# Patient Record
Sex: MALE | Race: WHITE | Employment: OTHER | URBAN - METROPOLITAN AREA
[De-identification: names, ages, dates, MRNs, and addresses within clinical notes are randomized per-mention and may not be internally consistent; named-entity substitution may affect disease eponyms.]

---

## 2024-06-26 ENCOUNTER — OFFICE VISIT (OUTPATIENT)
Dept: INTERNAL MEDICINE CLINIC | Facility: CLINIC | Age: 49
End: 2024-06-26
Payer: COMMERCIAL

## 2024-06-26 ENCOUNTER — TELEPHONE (OUTPATIENT)
Age: 49
End: 2024-06-26

## 2024-06-26 VITALS
DIASTOLIC BLOOD PRESSURE: 84 MMHG | WEIGHT: 315 LBS | BODY MASS INDEX: 50.62 KG/M2 | HEIGHT: 66 IN | TEMPERATURE: 98.1 F | HEART RATE: 71 BPM | OXYGEN SATURATION: 96 % | SYSTOLIC BLOOD PRESSURE: 130 MMHG

## 2024-06-26 DIAGNOSIS — E66.01 MORBID OBESITY (HCC): Primary | ICD-10-CM

## 2024-06-26 PROCEDURE — 99213 OFFICE O/P EST LOW 20 MIN: CPT | Performed by: INTERNAL MEDICINE

## 2024-06-26 NOTE — TELEPHONE ENCOUNTER
Left message on pt voicemail to call back to reschedule his procedure. Pt procedure was cancel due to insurance not in network.

## 2024-06-26 NOTE — PROGRESS NOTES
"Assessment/Plan:    Morbid  Obesity, Hyperlipidemia     Diagnoses and all orders for this visit:    Morbid obesity (HCC)  -     Ambulatory Referral to Weight Management; Future          Subjective: No  complaints     Patient ID: Nasir Puente is a 49 y.o. male.    HPI    The following portions of the patient's history were reviewed and updated as appropriate: allergies, current medications, past family history, past medical history, past social history, past surgical history, and problem list.    Review of Systems   Constitutional: Negative.    HENT:  Negative for dental problem, drooling, ear discharge and ear pain.    Eyes:  Negative for discharge, redness and itching.   Respiratory:  Negative for apnea, cough and wheezing.    Cardiovascular:  Negative for chest pain and palpitations.   Gastrointestinal:  Negative for abdominal pain, blood in stool, diarrhea and vomiting.   Endocrine: Negative for polydipsia, polyphagia and polyuria.   Genitourinary:  Negative for decreased urine volume, dysuria and frequency.   Musculoskeletal:  Negative for arthralgias, myalgias and neck stiffness.   Skin:  Negative for pallor and wound.   Allergic/Immunologic: Negative for environmental allergies and food allergies.   Neurological:  Negative for facial asymmetry, light-headedness, numbness and headaches.   Hematological:  Negative for adenopathy. Does not bruise/bleed easily.   Psychiatric/Behavioral:  Negative for agitation, behavioral problems and confusion.          Objective:      /84 (BP Location: Left arm, Patient Position: Sitting, Cuff Size: Standard)   Pulse 71   Temp 98.1 °F (36.7 °C) (Temporal)   Ht 5' 6\" (1.676 m)   Wt (!) 155 kg (342 lb)   SpO2 96%   BMI 55.20 kg/m²          Physical Exam  Constitutional:       Appearance: Normal appearance. He is obese.   HENT:      Head: Normocephalic.      Nose: Nose normal.      Mouth/Throat:      Mouth: Mucous membranes are moist.   Eyes:      Pupils: Pupils are " equal, round, and reactive to light.   Cardiovascular:      Rate and Rhythm: Regular rhythm.      Heart sounds: Normal heart sounds.   Pulmonary:      Breath sounds: Normal breath sounds.   Abdominal:      Palpations: Abdomen is soft.   Musculoskeletal:         General: No swelling.      Cervical back: Neck supple.   Skin:     General: Skin is warm.   Neurological:      General: No focal deficit present.      Mental Status: He is alert and oriented to person, place, and time.   Psychiatric:         Mood and Affect: Mood normal.       Severe  Obesity  Referred  to  Weight loss  Clinic  to  loose  weight    Hyperlipidemia     Diet    Repeat  lipid  panel  in  6 months.      Mateus Thompson MD.FACP

## 2024-10-28 ENCOUNTER — TELEPHONE (OUTPATIENT)
Age: 49
End: 2024-10-28

## 2024-10-28 NOTE — TELEPHONE ENCOUNTER
Called patient to ask if he has new insurance now to be able to get colonoscopy done. Previous scheduled with gastro in July but not done due to insurance.

## 2024-11-20 NOTE — PROGRESS NOTES
Assessment/Plan:  Nasir was seen today for consult.    Diagnoses and all orders for this visit:    Obesity, Class III, BMI 40-49.9 (morbid obesity) (HCC)  -     tirzepatide (Zepbound) 2.5 mg/0.5 mL auto-injector; Inject 0.5 mL (2.5 mg total) under the skin once a week for 28 days    Hyperlipidemia  -     tirzepatide (Zepbound) 2.5 mg/0.5 mL auto-injector; Inject 0.5 mL (2.5 mg total) under the skin once a week for 28 days    Morbid obesity (HCC)  -     Ambulatory Referral to Weight Management    Prediabetes  -     tirzepatide (Zepbound) 2.5 mg/0.5 mL auto-injector; Inject 0.5 mL (2.5 mg total) under the skin once a week for 28 days    Essential hypertension  -     tirzepatide (Zepbound) 2.5 mg/0.5 mL auto-injector; Inject 0.5 mL (2.5 mg total) under the skin once a week for 28 days  -     valsartan (DIOVAN) 40 mg tablet; Take 1 tablet (40 mg total) by mouth daily       No problem-specific Assessment & Plan notes found for this encounter.       - Discussed options of HealthyCORE-Intensive Lifestyle Intervention Program, Very Low Calorie Diet-VLCD, Conservative Program, and Vertical Sleeve Gastrectomy and the role of weight loss medications.  - Explained the importance of making lifestyle changes first before starting anti-obesity medications.  - Patient should demonstrate lifestyle changes first before anti-obesity medication initiated.   - Patient is interested in pursuing Conservative Program  - Initial weight loss goal of 5-10% weight loss for improved health as studies have shown this is where we see the greatest impact on improving health and decreasing risk of obesity related conditions.  - Weight loss can improve patient's co-morbid conditions and/or prevent weight-related complications.  - Stop Bang 7/8 -- referred to sleep medicine   - Labs reviewed: As below.      General Recommendations:  Nutrition:  Eat breakfast daily.  Do not skip meals.     Food log (ie.) www.myfitnesspal.com, sparkpeople.com,  loseit.com, calorieking.com, etc.    Practice mindful eating.  Be sure to set aside time to eat, eat slowly, and savor your food.    Hydration:    At least 64oz of water daily.  No sugar sweetened beverages.  No juice (eat the fruit instead).    Exercise:  Studies have shown that the ideal exercise goal is somewhere between 150 to 300 minutes of moderate intensity exercise a week.  Start with exercising 10 minutes every other day and gradually increase physical activity with a goal of at least 150 minutes of moderate intensity exercise a week, divided over at least 3 days a week.  An example of this would be exercising 30 minutes a day, 5 days a week.  Resistance training can increase muscle mass and increase our resting metabolic rate.   FULL BODY resistance training is recommended 2-3 times a week.  Do not do this on consecutive days to allow for muscle recovery.    Aim for a bare minimum 5000 steps, even on days you do not exercise.    Monitoring:   Weigh yourself daily.  If this causes undue stress, then just weigh yourself once a week.  Weigh yourself the same time of the day with the same amount of clothing on.  Preferably this should be done after waking up, before you eat, and with no clothing or minimal clothing on.    Specific Goals:  5-10 servings of fruits and vegetables per day  Food log (ie.) www.hiyalife.com,sparkpeople.com,loseit.com,calorieking.com,etc.   Gradually increase physical activity to a goal of 5 days per week for 30 minutes of MODERATE intensity PLUS 2 days per week of FULL BODY resistance training    Calorie goal:  [id number](Provided with meal plan to follow).    Return visit:  2 months    1)Start on BP medication; patient will follow up with his PCP on 12/5. Will call PCP as well.   2) referral for dietitian meal planning 60 minute visit  3) RX sent in for Zepbound. If this is not covered, we will try to start with Contrave after blood pressure is controlled.      I have sent  Zebound to your pharmacy. The prior authorization process will been done through our prior authorization team and can take up to 3-4 weeks to process through the insurance.     - Start Zepbound 2.5 mg subcutaneously weekly. After you have taken the second pen, please give me an update, as we will likely increase the dose the next month if you are tolerating it well.    - Side effects of Zepbound include nausea, vomiting, diarrhea, or constipation. Keep an eye on your heart rate while on Zepbound. If you resting heart rate is greater than 100 beats per minutes, please notify me. If you develop severe abdominal pain, stop Zepbound and go to the emergency room, as that could be a sign of pancreatitis.     - Please notify me if you have surgery, upper endoscopy, or colonoscopy scheduled, as we typically hold Zepbound for one week prior to the procedure.     4) Sleep medicine referral-- encouraged patient to go as he already has a referral  5) Physical Activity RX:  Recommend patient wears a watch to track his step count; recommend minimum of 5k per day and goal of 8-10k per day.   6) Nutrition RX: given handouts today for protein snacks/shakes, 2000 calorie meal plan and food journal to track         Total time spent reviewing chart, interviewing patient, examining patient, discussing plan, answering all questions, and documentin min.       ______________________________________________________________________        Subjective:   Chief Complaint   Patient presents with    Consult     MWM- Consult; [id number]; Waist-57.5in; Stop Bang-       HPI: Nasir Puente  is a 49 y.o. male with history of  impaired fasting blood sugar, hyperlipidemia and excess weight, here to pursue weight loss management.  Previous notes and records have been reviewed. Referred by PCP.     Blood pressure high in office, not on medication, he also has been having headaches.   He started to gain weight after marriage.   Patient has worked  with nutritionist in the past and has lost weight with calorie deficit and portion sizes.   Brazilian- enjoy meat, rice, beans  Exercise- enjoys dancing, can walk in his neighborhood and around park/town.       Blood pressure is elevated today;   Sleep BANG 7/8-- > needs to go to sleep medicine   Prefers medication         HPI  Wt Readings from Last 20 Encounters:   11/21/24 (!) 158 kg (349 lb 6.4 oz)   06/26/24 (!) 155 kg (342 lb)   05/08/24 (!) 156 kg (343 lb)   02/09/23 (!) 158 kg (348 lb)   09/27/22 (!) 152 kg (336 lb)   07/06/22 (!) 151 kg (332 lb)   06/01/22 (!) 148 kg (326 lb)   08/31/21 (!) 147 kg (324 lb)   03/29/21 (!) 147 kg (323 lb)   10/29/17 136 kg (300 lb)   07/26/17 134 kg (296 lb)     Excess Weight:  Severity: severe  Onset:  30's   Highest weight: 349 lbs  Lowest Weight:   Current weight: 349 lbs  What has been tried: Diet and Exercise, Over the Counter Weight Loss Supplements, and nutritionist   Contributing factors: Poor Food Choices, Insufficient Physical Activity, and Stress/Emotional Eating  Associated symptoms and effects: comorbid conditions, fatigue, increased joint pain, decreased exercise capacity, and increased shortness of breath    Food logging: (did not have time to complete today). Wife cooks dinner at home; portion size is patients issue per wife  B:   S:  L:  S:  D:  S: frequent snacking at night as he is unable to sleep     Hunger/Cravings: hungry all the time; trouble with sleeping at night and increased snacking at night   Dining out: 1x per week  Hydration: monster energy 1 drink, 1 coke every once in a while. Drinks a lot of water   Alcohol: social 1x per month   Smoking: smoked 1 day and stopping again  Exercise:  Weight Monitoring:  Sleep: only sleeps for about 5 hrs   STOP-BANG Score:  Occupation: construction, carpentry. 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  Denies any hx of glaucoma, seizures, kidney stones, gallstones.  Denies Hx of CAD, PAD, palpitations, arrhythmia.   Denies uncontrolled anxiety or depression, suicidal behavior or thinking , insomnia or sleep disturbance.   Past Surgical History:   Procedure Laterality Date    FACIAL/NECK BIOPSY Left 7/26/2017    Procedure: SHOULDER,NECK & EPIGASTRIC AREA MASS EXCISIONS abdominal lesions x2;  Surgeon: Randy Mcmahon MD;  Location: [facility];  Service: General    GA RPR UMBILICAL HRNA 5 YRS/> REDUCIBLE N/A 7/26/2017    Procedure: REPAIR HERNIA UMBILICAL;  Surgeon: Randy Mcmahon MD;  Location: [facility];  Service: General     The following portions of the patient's history were reviewed and updated as appropriate: allergies, current medications, past family history, past medical history, past social history, past surgical history, and problem list.    Review Of Systems:  Review of Systems   Constitutional:  Positive for fatigue.   HENT: Negative.     Eyes: Negative.    Respiratory: Negative.     Gastrointestinal:  Negative for constipation, diarrhea and nausea.   Genitourinary: Negative.    Musculoskeletal: Negative.    Skin: Negative.    Allergic/Immunologic: Negative.    Neurological:  Positive for headaches.   Hematological: Negative.    Psychiatric/Behavioral: Negative.         Objective:  /100 (BP Location: Right arm, Patient Position: Sitting)   Pulse 61   Temp 97.9 °F (36.6 °C) (Tympanic)   Resp 16   Ht 5' 9\" (1.753 m)   Wt (!) 158 kg (349 lb 6.4 oz)   BMI 51.60 kg/m²   Physical Exam  Vitals reviewed.   Constitutional:       Appearance: He is obese.   HENT:      Head: Normocephalic.      Mouth/Throat:      Mouth: Mucous membranes are moist.   Eyes:      Pupils: Pupils are equal, round, and reactive to light.   Cardiovascular:      Rate and Rhythm: Normal rate and regular rhythm.   Pulmonary:      Effort: Pulmonary effort is normal.      Breath sounds: " Normal breath sounds.   Abdominal:      General: Bowel sounds are normal.      Palpations: Abdomen is soft.   Musculoskeletal:         General: Normal range of motion.      Cervical back: Normal range of motion.   Skin:     General: Skin is warm and dry.   Neurological:      General: No focal deficit present.      Mental Status: He is alert.   Psychiatric:         Mood and Affect: Mood normal.         Thought Content: Thought content normal.         Judgment: Judgment normal.         Labs and Imaging  Recent labs and imaging have been personally reviewed.  Lab Results   Component Value Date    WBC 7.27 05/09/2024    HGB 14.7 05/09/2024    HCT 45.5 05/09/2024    MCV 87 05/09/2024     05/09/2024     Lab Results   Component Value Date    SODIUM 141 05/09/2024    K 4.0 05/09/2024     05/09/2024    CO2 34 (H) 05/09/2024    AGAP 5 05/09/2024    BUN 11 05/09/2024    CREATININE 0.78 05/09/2024    GLUF 92 05/09/2024    CALCIUM 9.3 05/09/2024    AST 17 05/09/2024    ALT 22 05/09/2024    ALKPHOS 68 05/09/2024    TP 7.3 05/09/2024    TBILI 0.54 05/09/2024    EGFR 105 05/09/2024     Lab Results   Component Value Date    HGBA1C 5.7 (H) 09/01/2021     Lab Results   Component Value Date    CVY5UDVJWLST 3.403 05/09/2024     Lab Results   Component Value Date    CHOLESTEROL 219 (H) 05/09/2024     Lab Results   Component Value Date    HDL 44 05/09/2024     Lab Results   Component Value Date    TRIG 136 05/09/2024     Lab Results   Component Value Date    LDLCALC 148 (H) 05/09/2024

## 2024-11-21 ENCOUNTER — TELEPHONE (OUTPATIENT)
Age: 49
End: 2024-11-21

## 2024-11-21 ENCOUNTER — OFFICE VISIT (OUTPATIENT)
Age: 49
End: 2024-11-21
Payer: COMMERCIAL

## 2024-11-21 VITALS
RESPIRATION RATE: 16 BRPM | DIASTOLIC BLOOD PRESSURE: 100 MMHG | WEIGHT: 315 LBS | SYSTOLIC BLOOD PRESSURE: 160 MMHG | HEIGHT: 69 IN | TEMPERATURE: 97.9 F | BODY MASS INDEX: 46.65 KG/M2 | HEART RATE: 61 BPM

## 2024-11-21 DIAGNOSIS — E78.5 HYPERLIPIDEMIA: ICD-10-CM

## 2024-11-21 DIAGNOSIS — R73.03 PREDIABETES: ICD-10-CM

## 2024-11-21 DIAGNOSIS — I10 ESSENTIAL HYPERTENSION: ICD-10-CM

## 2024-11-21 DIAGNOSIS — E66.01 OBESITY, CLASS III, BMI 40-49.9 (MORBID OBESITY) (HCC): Primary | ICD-10-CM

## 2024-11-21 DIAGNOSIS — E66.01 MORBID OBESITY (HCC): ICD-10-CM

## 2024-11-21 PROCEDURE — 99204 OFFICE O/P NEW MOD 45 MIN: CPT | Performed by: PHYSICIAN ASSISTANT

## 2024-11-21 RX ORDER — TIRZEPATIDE 2.5 MG/.5ML
2.5 INJECTION, SOLUTION SUBCUTANEOUS WEEKLY
Qty: 2 ML | Refills: 0 | Status: SHIPPED | OUTPATIENT
Start: 2024-11-21 | End: 2024-12-19

## 2024-11-21 RX ORDER — VALSARTAN 40 MG/1
40 TABLET ORAL DAILY
Qty: 30 TABLET | Refills: 1 | Status: SHIPPED | OUTPATIENT
Start: 2024-11-21

## 2024-11-21 NOTE — TELEPHONE ENCOUNTER
Naomi Grayson PA-C with [facility] called to inform PCP. Pt seen in office today. Noted elevated BP, 160/100. Wife reports Pt has been having headaches recently. Naomi Grayson PA-C  started Pt on valsartan (DIOVAN) 40 mg tablet for the same with instructions to f/u with PCP as soon as possible.     No appointments seen with PCP for the next two weeks. Please inform PCP and further advise/follow up with Pt. Thank you

## 2024-11-22 ENCOUNTER — OFFICE VISIT (OUTPATIENT)
Dept: INTERNAL MEDICINE CLINIC | Facility: CLINIC | Age: 49
End: 2024-11-22
Payer: COMMERCIAL

## 2024-11-22 VITALS
BODY MASS INDEX: 46.65 KG/M2 | SYSTOLIC BLOOD PRESSURE: 138 MMHG | HEART RATE: 63 BPM | WEIGHT: 315 LBS | OXYGEN SATURATION: 99 % | HEIGHT: 69 IN | DIASTOLIC BLOOD PRESSURE: 86 MMHG | TEMPERATURE: 97.9 F

## 2024-11-22 DIAGNOSIS — R53.83 OTHER FATIGUE: ICD-10-CM

## 2024-11-22 DIAGNOSIS — Z12.11 SCREENING FOR COLON CANCER: ICD-10-CM

## 2024-11-22 DIAGNOSIS — R03.0 PREHYPERTENSION: Primary | ICD-10-CM

## 2024-11-22 DIAGNOSIS — R35.1 BPH ASSOCIATED WITH NOCTURIA: ICD-10-CM

## 2024-11-22 DIAGNOSIS — N40.1 BPH ASSOCIATED WITH NOCTURIA: ICD-10-CM

## 2024-11-22 PROCEDURE — 99214 OFFICE O/P EST MOD 30 MIN: CPT | Performed by: INTERNAL MEDICINE

## 2024-11-22 NOTE — PROGRESS NOTES
Assessment/Plan:           1. Prehypertension  Comments:  Patient was advised to lose weight and monitor his blood pressure closely at home.  2. Screening for colon cancer  Comments:  Colonoscopy was advised.  Orders:  -     Ambulatory Referral to Gastroenterology; Future  3. BPH associated with nocturia  Comments:  PSA was ordered.  Orders:  -     PSA Total, Diagnostic; Future  4. Other fatigue  Comments:  This may be multifactorial.  Blood work ordered  Orders:  -     TSH, 3rd generation; Future  -     T4, free; Future         1. Screening for colon cancer (Primary)    - Ambulatory Referral to Gastroenterology; Future    2. BPH associated with nocturia    - PSA Total, Diagnostic; Future    3. Other fatigue    - TSH, 3rd generation; Future  - T4, free; Future       No problem-specific Assessment & Plan notes found for this encounter.           Subjective:      Patient ID: Nasir Puente is a 49 y.o. male.    HPI    The following portions of the patient's history were reviewed and updated as appropriate: He  has a past medical history of Asthma.  He   Patient Active Problem List    Diagnosis Date Noted    Impaired fasting blood sugar 07/06/2022    Vitamin B-complex deficiency 10/20/2021    Pain in both feet 08/31/2021    Morbid obesity (HCC) 08/31/2021    Other fatigue 08/31/2021    Body mass index 50.0-59.9, adult (HCC) 08/31/2021    Skin tag 08/31/2021    Urinary frequency 08/31/2021     He  has a past surgical history that includes pr rpr umbilical hrna 5 yrs/> reducible (N/A, 7/26/2017) and FACIAL/NECK BIOPSY (Left, 7/26/2017).  His family history includes Colon cancer in his father; Heart disease in his mother; No Known Problems in his family.  He  reports that he has quit smoking. He has never used smokeless tobacco. He reports current alcohol use of about 7.0 standard drinks of alcohol per week. He reports that he does not use drugs.  Current Outpatient Medications   Medication Sig Dispense Refill     acetaminophen (TYLENOL) 325 mg tablet Take 650 mg by mouth every 6 (six) hours as needed for mild pain      albuterol (ProAir HFA) 90 mcg/act inhaler Inhale 2 puffs every 6 (six) hours as needed for wheezing (Patient not taking: Reported on 11/22/2024) 8.5 g 2    cetirizine (ZyrTEC) 10 mg tablet Take 1 tablet (10 mg total) by mouth daily (Patient not taking: Reported on 7/6/2022) 30 tablet 2    gabapentin (NEURONTIN) 300 mg capsule Take 1 capsule (300 mg total) by mouth 2 (two) times a day (Patient not taking: Reported on 8/31/2021) 60 capsule 1    meloxicam (MOBIC) 15 mg tablet Take 1 tablet (15 mg total) by mouth daily (Patient not taking: Reported on 6/1/2022) 30 tablet 0    montelukast (SINGULAIR) 10 mg tablet Take 1 tablet (10 mg total) by mouth daily at bedtime (Patient not taking: Reported on 7/6/2022) 30 tablet 2    Mounjaro 5 MG/0.5ML Inject 0.5 mL (5 mg total) under the skin every 7 days (Patient not taking: Reported on 6/26/2024) 2 mL 1    omeprazole (PriLOSEC) 40 MG capsule Take 1 capsule (40 mg total) by mouth daily Half an hour prior to breakfast (Patient not taking: Reported on 11/22/2024) 30 capsule 2    polyethylene glycol (GOLYTELY) 4000 mL solution Take 4,000 mL by mouth once for 1 dose (Patient not taking: Reported on 11/22/2024) 4000 mL 0    tirzepatide (Zepbound) 2.5 mg/0.5 mL auto-injector Inject 0.5 mL (2.5 mg total) under the skin once a week for 28 days (Patient not taking: Reported on 11/22/2024) 2 mL 0    valsartan (DIOVAN) 40 mg tablet Take 1 tablet (40 mg total) by mouth daily (Patient not taking: Reported on 11/22/2024) 30 tablet 1     No current facility-administered medications for this visit.     Current Outpatient Medications on File Prior to Visit   Medication Sig    acetaminophen (TYLENOL) 325 mg tablet Take 650 mg by mouth every 6 (six) hours as needed for mild pain    albuterol (ProAir HFA) 90 mcg/act inhaler Inhale 2 puffs every 6 (six) hours as needed for wheezing (Patient  not taking: Reported on 11/22/2024)    cetirizine (ZyrTEC) 10 mg tablet Take 1 tablet (10 mg total) by mouth daily (Patient not taking: Reported on 7/6/2022)    gabapentin (NEURONTIN) 300 mg capsule Take 1 capsule (300 mg total) by mouth 2 (two) times a day (Patient not taking: Reported on 8/31/2021)    meloxicam (MOBIC) 15 mg tablet Take 1 tablet (15 mg total) by mouth daily (Patient not taking: Reported on 6/1/2022)    montelukast (SINGULAIR) 10 mg tablet Take 1 tablet (10 mg total) by mouth daily at bedtime (Patient not taking: Reported on 7/6/2022)    Mounjaro 5 MG/0.5ML Inject 0.5 mL (5 mg total) under the skin every 7 days (Patient not taking: Reported on 6/26/2024)    omeprazole (PriLOSEC) 40 MG capsule Take 1 capsule (40 mg total) by mouth daily Half an hour prior to breakfast (Patient not taking: Reported on 11/22/2024)    polyethylene glycol (GOLYTELY) 4000 mL solution Take 4,000 mL by mouth once for 1 dose (Patient not taking: Reported on 11/22/2024)    tirzepatide (Zepbound) 2.5 mg/0.5 mL auto-injector Inject 0.5 mL (2.5 mg total) under the skin once a week for 28 days (Patient not taking: Reported on 11/22/2024)    valsartan (DIOVAN) 40 mg tablet Take 1 tablet (40 mg total) by mouth daily (Patient not taking: Reported on 11/22/2024)     No current facility-administered medications on file prior to visit.     There are no discontinued medications.   He has no known allergies..    Review of Systems   Constitutional:  Negative for appetite change, chills, fatigue and fever.   HENT:  Negative for sore throat and trouble swallowing.    Eyes:  Negative for redness.   Respiratory:  Negative for shortness of breath.    Cardiovascular:  Negative for chest pain and palpitations.   Gastrointestinal:  Negative for abdominal pain, constipation and diarrhea.   Genitourinary:  Negative for dysuria and hematuria.   Musculoskeletal:  Negative for back pain and neck pain.   Skin:  Negative for rash.   Neurological:   "Negative for seizures, weakness and headaches.   Hematological:  Negative for adenopathy.   Psychiatric/Behavioral:  Negative for confusion. The patient is not nervous/anxious.          Objective:      /86 (BP Location: Left arm, Patient Position: Standing, Cuff Size: Standard)   Pulse 63   Temp 97.9 °F (36.6 °C) (Temporal)   Ht 5' 9\" (1.753 m)   Wt (!) 160 kg (352 lb)   SpO2 99%   BMI 51.98 kg/m²     Results Reviewed       None            No results found for this or any previous visit (from the past 8 weeks).     Physical Exam  Constitutional:       Appearance: Normal appearance. He is normal weight.   HENT:      Head: Normocephalic and atraumatic.      Nose: Nose normal.      Mouth/Throat:      Mouth: Mucous membranes are moist.   Eyes:      Extraocular Movements: Extraocular movements intact.      Pupils: Pupils are equal, round, and reactive to light.   Pulmonary:      Effort: Pulmonary effort is normal.   Abdominal:      Palpations: Abdomen is soft.   Musculoskeletal:         General: Normal range of motion.      Cervical back: Normal range of motion and neck supple.   Neurological:      General: No focal deficit present.      Mental Status: He is alert and oriented to person, place, and time. Mental status is at baseline.         "

## 2024-12-05 ENCOUNTER — RA CDI HCC (OUTPATIENT)
Dept: OTHER | Facility: HOSPITAL | Age: 49
End: 2024-12-05

## 2024-12-30 ENCOUNTER — TELEPHONE (OUTPATIENT)
Age: 49
End: 2024-12-30

## 2024-12-30 ENCOUNTER — CLINICAL SUPPORT (OUTPATIENT)
Age: 49
End: 2024-12-30

## 2024-12-30 VITALS — WEIGHT: 315 LBS | HEIGHT: 69 IN | BODY MASS INDEX: 46.65 KG/M2

## 2024-12-30 DIAGNOSIS — I10 ESSENTIAL HYPERTENSION: ICD-10-CM

## 2024-12-30 DIAGNOSIS — E78.5 HYPERLIPIDEMIA: ICD-10-CM

## 2024-12-30 DIAGNOSIS — R63.5 ABNORMAL WEIGHT GAIN: Primary | ICD-10-CM

## 2024-12-30 DIAGNOSIS — E66.01 OBESITY, CLASS III, BMI 40-49.9 (MORBID OBESITY) (HCC): Primary | ICD-10-CM

## 2024-12-30 DIAGNOSIS — R73.03 PREDIABETES: ICD-10-CM

## 2024-12-30 PROCEDURE — RECHECK

## 2024-12-30 PROCEDURE — WMDI60

## 2024-12-30 RX ORDER — TIRZEPATIDE 2.5 MG/.5ML
2.5 INJECTION, SOLUTION SUBCUTANEOUS WEEKLY
Qty: 2 ML | Refills: 0 | Status: SHIPPED | OUTPATIENT
Start: 2024-12-30 | End: 2025-01-07

## 2024-12-30 NOTE — TELEPHONE ENCOUNTER
PA for Zepbound SUBMITTED to [employer]     via    [x]CMM-KEY: [id number]  []Surescripts-Case ID #    []Availity-Auth ID #  NDC #    []Faxed to plan   []Other website    []Phone call Case ID #      []PA sent as URGENT    All office notes, labs and other pertaining documents and studies sent. Clinical questions answered. Awaiting determination from insurance company.     Turnaround time for your insurance to make a decision on your Prior Authorization can take 7-21 business days.

## 2024-12-30 NOTE — PROGRESS NOTES
"Weight Management Medical Nutrition Assessment  Nasir presented for a meal planning session with wife. Pt is [place] and speaks [place]ian. Wife was able to assist with translation. Today's weight is 345.6#, loss of 3.4# or 1% TBW.  Hx of impaired fasting blood sugar, hyperlipidemia , HTN. Prescribed Zepbound but waiting for coverage. Highly motivated to start the combination of medication and calorie restriction.    Per dietary recall patient consumes excess calories from  carbohydrates such as bread and excess dining out at [facility].  Wife states patient enjoys large servings of carbohydrates such as bread, rice and pasta and also frequent stops to [facility] for bread, sandwiches and coffee at least 5-6 times a week. Educated on the importance of balancing all meals and with the importance of adequate protein and fiber. Reviewed lean protein and balanced snacks. Hydration adequate.   Developed and reviewed a low calorie meal plan. Goals established. Will follow up in 4 weeks.      Goals:  1) goal is to add more vegetables   2) Add in exercise 2-3 days a week(walkling)     Patient seen by Medical Provider in past 6 months:  yes 11/21/2024  Requested to schedule appointment with Medical Provider: No      Anthropometric Measurements  Start Weight (#) & Date: 349# 11/21/2024  Current Weight (#): 345.6#  TBW % Change from start weight:1%  Ideal Body Weight (#):160# (5'9)  Goal Weight (#):\"Better health to be around family\"   Highest: 349#  Lowest: 240#    Weight Loss History  Previous weight loss attempts: Diet and Exercise, Over the Counter Weight Loss Supplements, and nutritionist     Food and Nutrition Related History  Wake up: Varies 5-8 am   Bed Time:Varies--doesn't sleep well at night    Food Recall  Breakfast:6 am: Coffee with sugar with 4 tsp, and cream a \"little bit\" unmeasured Sometimes from [employer]. Breakfast sandwich 1-2 eggs, 1 slice of cheese or [employer] short bread with butter    Snack:skip  Lunch:12-1 pm: Rice and " "beans, meat/chicken or sandwich leftovers from dinner night before or from [facility]  grilled chicken/tomato/ lettuce/ rider or sometimes burrito   Snack:coffee 16 oz sometimes with Grilled cheese from [facility]  Dinner:Homemade: Rice + beans+  meat( chicken/fish) + salad Eats late around 9 pm  Snack: n/a      Beverages: water, juice, and chocolate milk  Volume of beverage intake: 32 oz coffee, 5 16 oz bottles water a day    Weekends: Same Meals are not regualr- varied. Can skip meals breakfast and lunch  Cravings: bread and rice  Trouble area of day:Dinner    Frequency of Eating out: 5-6 days a week  Food restrictions:n/a  Cooking: Wife  Food Shopping: Wife    Physical Activity Intake  Activity:enjoys dancing, can walk in his neighborhood and around park/town. Works as a construction work.Would like to start walking 3 times a week in the community center  Frequency: n/a  Physical limitations/barriers to exercise: \"dislikes\" exercise    Estimated Needs  Energy  SECA: BMR:n/a      X 1.3 -1000 =  [place] [employer] Energy Needs: BMR : 2418   1-2# loss weekly sedentary:  9992-5614             1-2# loss weekly lightly active:4745-7292  Maintenance calories for sedentary activity level: 2902  Protein: grams      (1.2-1.5g/kg IBW)  Fluid: 84 oz     (35mL/kg IBW)    Nutrition Diagnosis  Yes;    Overweight/obesity  related to Excess energy intake as evidenced by  BMI more than normative standard for age and sex (obesity-grade III 40+)       Nutrition Intervention    Nutrition Prescription  Calories:2200 kcal   Protein:100 grams  Fluid:84 oz    Meal Plan (José Miguel/Pro/Carb)  Breakfast:450/25-30  Snack:200/5-10 grams  Lunch:550/25-35 grams  Snack:200/5-10 grams  Dinner:600 kcal/25-35 kcal  Snack:skip    Nutrition Education:    Calorie controlled menu  Lean protein food choices  Healthy snack options  Food journaling tips      Nutrition Counseling:  Strategies: meal planning, portion sizes, healthy snack choices, hydration, fiber intake, " protein intake, exercise, food journal      Monitoring and Evaluation:  Evaluation criteria:  Energy Intake  Meet protein needs  Maintain adequate hydration  Monitor weekly weight  Meal planning/preparation  Food journal   Decreased portions at mealtimes and snacks  Physical activity     Barriers to learning:language  Readiness to change: Action:  (Changing behavior)  Comprehension: good  Expected Compliance: good

## 2025-01-02 ENCOUNTER — PATIENT MESSAGE (OUTPATIENT)
Age: 50
End: 2025-01-02

## 2025-01-02 NOTE — TELEPHONE ENCOUNTER
PA for Zepbound DENIED    Reason:(Screenshot if applicable)Current benefits do not include coverage for anti obesity therapy        Message sent to office clinical pool No      Denial letter scanned into Media Yes    Appeal started No (Provider will need to decide if appeal is warranted and send clinical documentation to Prior Authorization Team for initiation.)    **Please follow up with your patient regarding denial and next steps**

## 2025-01-03 NOTE — PATIENT COMMUNICATION
Spouse called and asked if Naomi could call her to discuss the options, since Zepbound was denied. Please call Neela at [phone] to discuss.  She said her  does not speak good english.

## 2025-01-06 ENCOUNTER — TELEPHONE (OUTPATIENT)
Age: 50
End: 2025-01-06

## 2025-01-07 DIAGNOSIS — R06.83 SNORING: ICD-10-CM

## 2025-01-07 DIAGNOSIS — E66.01 OBESITY, CLASS III, BMI 40-49.9 (MORBID OBESITY) (HCC): Primary | ICD-10-CM

## 2025-01-07 DIAGNOSIS — R73.03 PREDIABETES: ICD-10-CM

## 2025-01-07 DIAGNOSIS — E78.5 HYPERLIPIDEMIA: ICD-10-CM

## 2025-01-07 DIAGNOSIS — I10 ESSENTIAL HYPERTENSION: ICD-10-CM

## 2025-01-07 RX ORDER — TIRZEPATIDE 2.5 MG/.5ML
2.5 INJECTION, SOLUTION SUBCUTANEOUS WEEKLY
Qty: 2 ML | Refills: 0 | Status: SHIPPED | OUTPATIENT
Start: 2025-01-07

## 2025-01-07 NOTE — PATIENT COMMUNICATION
Pt's spouse called and stated that she is still waiting for a call back to discuss the pt's Zepbound. Please call Neela at [phone]

## 2025-01-08 ENCOUNTER — APPOINTMENT (OUTPATIENT)
Dept: LAB | Age: 50
End: 2025-01-08
Payer: COMMERCIAL

## 2025-01-08 DIAGNOSIS — N40.1 BPH ASSOCIATED WITH NOCTURIA: ICD-10-CM

## 2025-01-08 DIAGNOSIS — R53.83 OTHER FATIGUE: ICD-10-CM

## 2025-01-08 DIAGNOSIS — R73.03 PREDIABETES: ICD-10-CM

## 2025-01-08 DIAGNOSIS — E78.5 HYPERLIPIDEMIA: ICD-10-CM

## 2025-01-08 DIAGNOSIS — E66.01 OBESITY, CLASS III, BMI 40-49.9 (MORBID OBESITY) (HCC): ICD-10-CM

## 2025-01-08 DIAGNOSIS — I10 ESSENTIAL HYPERTENSION: ICD-10-CM

## 2025-01-08 DIAGNOSIS — R35.1 BPH ASSOCIATED WITH NOCTURIA: ICD-10-CM

## 2025-01-08 LAB
ALBUMIN SERPL BCG-MCNC: 4.4 G/DL (ref 3.5–5)
ALP SERPL-CCNC: 63 U/L (ref 34–104)
ALT SERPL W P-5'-P-CCNC: 26 U/L (ref 7–52)
ANION GAP SERPL CALCULATED.3IONS-SCNC: 7 MMOL/L (ref 4–13)
AST SERPL W P-5'-P-CCNC: 18 U/L (ref 13–39)
BILIRUB SERPL-MCNC: 0.56 MG/DL (ref 0.2–1)
BUN SERPL-MCNC: 12 MG/DL (ref 5–25)
CALCIUM SERPL-MCNC: 9.2 MG/DL (ref 8.4–10.2)
CHLORIDE SERPL-SCNC: 103 MMOL/L (ref 96–108)
CHOLEST SERPL-MCNC: 212 MG/DL (ref ?–200)
CO2 SERPL-SCNC: 31 MMOL/L (ref 21–32)
CREAT SERPL-MCNC: 0.84 MG/DL (ref 0.6–1.3)
EST. AVERAGE GLUCOSE BLD GHB EST-MCNC: 131 MG/DL
GFR SERPL CREATININE-BSD FRML MDRD: 102 ML/MIN/1.73SQ M
GLUCOSE P FAST SERPL-MCNC: 83 MG/DL (ref 65–99)
HBA1C MFR BLD: 6.2 %
HDLC SERPL-MCNC: 38 MG/DL
INSULIN SERPL-ACNC: 13.32 UIU/ML (ref 1.9–23)
LDLC SERPL CALC-MCNC: 143 MG/DL (ref 0–100)
NONHDLC SERPL-MCNC: 174 MG/DL
POTASSIUM SERPL-SCNC: 4.3 MMOL/L (ref 3.5–5.3)
PROT SERPL-MCNC: 7.6 G/DL (ref 6.4–8.4)
PSA SERPL-MCNC: 0.29 NG/ML (ref 0–4)
SODIUM SERPL-SCNC: 141 MMOL/L (ref 135–147)
T4 FREE SERPL-MCNC: 0.85 NG/DL (ref 0.61–1.12)
TRIGL SERPL-MCNC: 153 MG/DL (ref ?–150)
TSH SERPL DL<=0.05 MIU/L-ACNC: 4.05 UIU/ML (ref 0.45–4.5)

## 2025-01-08 PROCEDURE — 83036 HEMOGLOBIN GLYCOSYLATED A1C: CPT

## 2025-01-08 PROCEDURE — 80053 COMPREHEN METABOLIC PANEL: CPT

## 2025-01-08 PROCEDURE — 80061 LIPID PANEL: CPT

## 2025-01-08 PROCEDURE — 36415 COLL VENOUS BLD VENIPUNCTURE: CPT

## 2025-01-08 PROCEDURE — 84439 ASSAY OF FREE THYROXINE: CPT

## 2025-01-08 PROCEDURE — 84153 ASSAY OF PSA TOTAL: CPT

## 2025-01-08 PROCEDURE — 84443 ASSAY THYROID STIM HORMONE: CPT

## 2025-01-08 PROCEDURE — 83525 ASSAY OF INSULIN: CPT

## 2025-01-09 ENCOUNTER — RESULTS FOLLOW-UP (OUTPATIENT)
Dept: INTERNAL MEDICINE CLINIC | Facility: CLINIC | Age: 50
End: 2025-01-09

## 2025-01-09 ENCOUNTER — RESULTS FOLLOW-UP (OUTPATIENT)
Age: 50
End: 2025-01-09

## 2025-01-21 ENCOUNTER — TELEPHONE (OUTPATIENT)
Age: 50
End: 2025-01-21

## 2025-01-21 ENCOUNTER — OFFICE VISIT (OUTPATIENT)
Age: 50
End: 2025-01-21

## 2025-01-21 ENCOUNTER — OFFICE VISIT (OUTPATIENT)
Dept: INTERNAL MEDICINE CLINIC | Facility: CLINIC | Age: 50
End: 2025-01-21

## 2025-01-21 VITALS
BODY MASS INDEX: 46.65 KG/M2 | SYSTOLIC BLOOD PRESSURE: 140 MMHG | HEIGHT: 69 IN | WEIGHT: 315 LBS | RESPIRATION RATE: 16 BRPM | HEART RATE: 79 BPM | DIASTOLIC BLOOD PRESSURE: 90 MMHG | TEMPERATURE: 97.6 F

## 2025-01-21 VITALS
SYSTOLIC BLOOD PRESSURE: 150 MMHG | HEART RATE: 66 BPM | HEIGHT: 69 IN | WEIGHT: 315 LBS | BODY MASS INDEX: 46.65 KG/M2 | OXYGEN SATURATION: 94 % | TEMPERATURE: 98.4 F | DIASTOLIC BLOOD PRESSURE: 96 MMHG

## 2025-01-21 DIAGNOSIS — E66.01 OBESITY, CLASS III, BMI 40-49.9 (MORBID OBESITY) (HCC): Primary | ICD-10-CM

## 2025-01-21 DIAGNOSIS — E78.5 HYPERLIPIDEMIA: ICD-10-CM

## 2025-01-21 DIAGNOSIS — E66.01 MORBID OBESITY (HCC): ICD-10-CM

## 2025-01-21 DIAGNOSIS — R73.03 PREDIABETES: ICD-10-CM

## 2025-01-21 DIAGNOSIS — M19.90 ARTHRITIS: ICD-10-CM

## 2025-01-21 DIAGNOSIS — Z12.11 SCREENING FOR COLON CANCER: ICD-10-CM

## 2025-01-21 DIAGNOSIS — I10 ESSENTIAL HYPERTENSION: Primary | ICD-10-CM

## 2025-01-21 DIAGNOSIS — I10 ESSENTIAL HYPERTENSION: ICD-10-CM

## 2025-01-21 DIAGNOSIS — R73.01 IMPAIRED FASTING BLOOD SUGAR: ICD-10-CM

## 2025-01-21 PROCEDURE — 99213 OFFICE O/P EST LOW 20 MIN: CPT | Performed by: INTERNAL MEDICINE

## 2025-01-21 PROCEDURE — 99214 OFFICE O/P EST MOD 30 MIN: CPT | Performed by: PHYSICIAN ASSISTANT

## 2025-01-21 RX ORDER — TIRZEPATIDE 2.5 MG/.5ML
2.5 INJECTION, SOLUTION SUBCUTANEOUS WEEKLY
Qty: 2 ML | Refills: 0 | Status: SHIPPED | OUTPATIENT
Start: 2025-01-21 | End: 2025-01-21

## 2025-01-21 RX ORDER — VALSARTAN 40 MG/1
40 TABLET ORAL DAILY
Qty: 30 TABLET | Refills: 1 | Status: CANCELLED | OUTPATIENT
Start: 2025-01-21

## 2025-01-21 RX ORDER — VALSARTAN 80 MG/1
80 TABLET ORAL DAILY
Qty: 90 TABLET | Refills: 1 | Status: SHIPPED | OUTPATIENT
Start: 2025-01-21

## 2025-01-21 RX ORDER — METFORMIN HYDROCHLORIDE 500 MG/1
1000 TABLET, EXTENDED RELEASE ORAL
Qty: 60 TABLET | Refills: 1 | Status: SHIPPED | OUTPATIENT
Start: 2025-01-21

## 2025-01-21 RX ORDER — TIRZEPATIDE 2.5 MG/.5ML
2.5 INJECTION, SOLUTION SUBCUTANEOUS WEEKLY
Qty: 2 ML | Refills: 0 | Status: SHIPPED | OUTPATIENT
Start: 2025-01-21 | End: 2025-02-20

## 2025-01-21 NOTE — PROGRESS NOTES
Assessment/Plan:           1. Essential hypertension  Comments:  Continue valsartan and continue to monitor at home.  Orders:  -     valsartan (DIOVAN) 80 mg tablet; Take 1 tablet (80 mg total) by mouth daily  -     Basic metabolic panel; Future; Expected date: 02/21/2025  2. Screening for colon cancer  -     Ambulatory Referral to Gastroenterology; Future  3. Impaired fasting blood sugar  4. Morbid obesity (HCC)  Comments:  Following with bariatrics and is on Mounjaro.  5. Arthritis  Comments:  Most likely degenerative joint disease and Tylenol was advised.         1. Essential hypertension (Primary)      2. Screening for colon cancer      3. Impaired fasting blood sugar      4. Morbid obesity (HCC)         No problem-specific Assessment & Plan notes found for this encounter.           Subjective:      Patient ID: Nasir Puente is a 49 y.o. male.    HPI    The following portions of the patient's history were reviewed and updated as appropriate: He  has a past medical history of Asthma.  He   Patient Active Problem List    Diagnosis Date Noted    Essential hypertension 01/21/2025    Impaired fasting blood sugar 07/06/2022    Vitamin B-complex deficiency 10/20/2021    Pain in both feet 08/31/2021    Morbid obesity (HCC) 08/31/2021    Other fatigue 08/31/2021    Skin tag 08/31/2021    Urinary frequency 08/31/2021     He  has a past surgical history that includes pr rpr umbilical hrna 5 yrs/> reducible (N/A, 7/26/2017) and FACIAL/NECK BIOPSY (Left, 7/26/2017).  His family history includes Colon cancer in his father; Heart disease in his mother; No Known Problems in his family.  He  reports that he has quit smoking. He has never used smokeless tobacco. He reports current alcohol use of about 7.0 standard drinks of alcohol per week. He reports that he does not use drugs.  Current Outpatient Medications   Medication Sig Dispense Refill    valsartan (DIOVAN) 80 mg tablet Take 1 tablet (80 mg total) by mouth daily 90 tablet  1    acetaminophen (TYLENOL) 325 mg tablet Take 650 mg by mouth every 6 (six) hours as needed for mild pain (Patient not taking: Reported on 1/21/2025)      albuterol (ProAir HFA) 90 mcg/act inhaler Inhale 2 puffs every 6 (six) hours as needed for wheezing (Patient not taking: Reported on 1/21/2025) 8.5 g 2    cetirizine (ZyrTEC) 10 mg tablet Take 1 tablet (10 mg total) by mouth daily (Patient not taking: Reported on 7/6/2022) 30 tablet 2    gabapentin (NEURONTIN) 300 mg capsule Take 1 capsule (300 mg total) by mouth 2 (two) times a day (Patient not taking: Reported on 8/31/2021) 60 capsule 1    meloxicam (MOBIC) 15 mg tablet Take 1 tablet (15 mg total) by mouth daily (Patient not taking: Reported on 6/1/2022) 30 tablet 0    metFORMIN (GLUCOPHAGE-XR) 500 mg 24 hr tablet Take 2 tablets (1,000 mg total) by mouth daily with breakfast (Patient not taking: Reported on 1/21/2025) 60 tablet 1    montelukast (SINGULAIR) 10 mg tablet Take 1 tablet (10 mg total) by mouth daily at bedtime (Patient not taking: Reported on 7/6/2022) 30 tablet 2    Mounjaro 5 MG/0.5ML Inject 0.5 mL (5 mg total) under the skin every 7 days (Patient not taking: Reported on 6/26/2024) 2 mL 1    polyethylene glycol (GOLYTELY) 4000 mL solution Take 4,000 mL by mouth once for 1 dose (Patient not taking: Reported on 6/26/2024) 4000 mL 0    Tirzepatide-Weight Management (Zepbound) 2.5 MG/0.5ML SOLN Inject 2.5 mg under the skin once a week (Patient not taking: Reported on 1/21/2025) 2 mL 0     No current facility-administered medications for this visit.     Current Outpatient Medications on File Prior to Visit   Medication Sig    [DISCONTINUED] valsartan (DIOVAN) 40 mg tablet Take 1 tablet (40 mg total) by mouth daily    acetaminophen (TYLENOL) 325 mg tablet Take 650 mg by mouth every 6 (six) hours as needed for mild pain (Patient not taking: Reported on 1/21/2025)    albuterol (ProAir HFA) 90 mcg/act inhaler Inhale 2 puffs every 6 (six) hours as needed  for wheezing (Patient not taking: Reported on 1/21/2025)    cetirizine (ZyrTEC) 10 mg tablet Take 1 tablet (10 mg total) by mouth daily (Patient not taking: Reported on 7/6/2022)    gabapentin (NEURONTIN) 300 mg capsule Take 1 capsule (300 mg total) by mouth 2 (two) times a day (Patient not taking: Reported on 8/31/2021)    meloxicam (MOBIC) 15 mg tablet Take 1 tablet (15 mg total) by mouth daily (Patient not taking: Reported on 6/1/2022)    metFORMIN (GLUCOPHAGE-XR) 500 mg 24 hr tablet Take 2 tablets (1,000 mg total) by mouth daily with breakfast (Patient not taking: Reported on 1/21/2025)    montelukast (SINGULAIR) 10 mg tablet Take 1 tablet (10 mg total) by mouth daily at bedtime (Patient not taking: Reported on 7/6/2022)    Mounjaro 5 MG/0.5ML Inject 0.5 mL (5 mg total) under the skin every 7 days (Patient not taking: Reported on 6/26/2024)    polyethylene glycol (GOLYTELY) 4000 mL solution Take 4,000 mL by mouth once for 1 dose (Patient not taking: Reported on 6/26/2024)    Tirzepatide-Weight Management (Zepbound) 2.5 MG/0.5ML SOLN Inject 2.5 mg under the skin once a week (Patient not taking: Reported on 1/21/2025)    [DISCONTINUED] omeprazole (PriLOSEC) 40 MG capsule Take 1 capsule (40 mg total) by mouth daily Half an hour prior to breakfast (Patient not taking: Reported on 11/22/2024)    [DISCONTINUED] Tirzepatide-Weight Management (Zepbound) 2.5 MG/0.5ML SOLN Inject 2.5 mg under the skin once a week (Patient not taking: Reported on 1/21/2025)    [DISCONTINUED] Tirzepatide-Weight Management (Zepbound) 2.5 MG/0.5ML SOLN Inject 2.5 mg under the skin once a week     No current facility-administered medications on file prior to visit.     Medications Discontinued During This Encounter   Medication Reason    valsartan (DIOVAN) 40 mg tablet     omeprazole (PriLOSEC) 40 MG capsule       He has no known allergies..    Review of Systems   Constitutional:  Negative for appetite change, chills, fatigue and fever.  "  HENT:  Negative for sore throat and trouble swallowing.    Eyes:  Negative for redness.   Respiratory:  Negative for shortness of breath.    Cardiovascular:  Negative for chest pain and palpitations.   Gastrointestinal:  Negative for abdominal pain, constipation and diarrhea.   Genitourinary:  Negative for dysuria and hematuria.   Musculoskeletal:  Positive for arthralgias. Negative for back pain and neck pain.   Skin:  Negative for rash.   Neurological:  Negative for seizures, weakness and headaches.   Hematological:  Negative for adenopathy.   Psychiatric/Behavioral:  Negative for confusion. The patient is not nervous/anxious.          Objective:      /96 (BP Location: Left arm, Patient Position: Sitting, Cuff Size: Standard)   Pulse 66   Temp 98.4 °F (36.9 °C) (Temporal)   Ht 5' 9\" (1.753 m)   Wt (!) 154 kg (340 lb)   SpO2 94%   BMI 50.21 kg/m²     Results Reviewed       None            Recent Results (from the past 8 weeks)   PSA Total, Diagnostic    Collection Time: 01/08/25 10:55 AM   Result Value Ref Range    PSA, Diagnostic 0.294 0.000 - 4.000 ng/mL   TSH, 3rd generation    Collection Time: 01/08/25 10:55 AM   Result Value Ref Range    TSH 3RD GENERATON 4.054 0.450 - 4.500 uIU/mL   T4, free    Collection Time: 01/08/25 10:55 AM   Result Value Ref Range    Free T4 0.85 0.61 - 1.12 ng/dL   Lipid panel    Collection Time: 01/08/25 10:55 AM   Result Value Ref Range    Cholesterol 212 (H) See Comment mg/dL    Triglycerides 153 (H) See Comment mg/dL    HDL, Direct 38 (L) >=40 mg/dL    LDL Calculated 143 (H) 0 - 100 mg/dL    Non-HDL-Chol (CHOL-HDL) 174 mg/dl   Insulin, fasting    Collection Time: 01/08/25 10:55 AM   Result Value Ref Range    Insulin, Fasting 13.32 1.90 - 23.00 uIU/mL   Hemoglobin A1C    Collection Time: 01/08/25 10:55 AM   Result Value Ref Range    Hemoglobin A1C 6.2 (H) Normal 4.0-5.6%; PreDiabetic 5.7-6.4%; Diabetic >=6.5%; Glycemic control for adults with diabetes <7.0% %    EAG " 131 mg/dl   Comprehensive metabolic panel    Collection Time: 01/08/25 10:55 AM   Result Value Ref Range    Sodium 141 135 - 147 mmol/L    Potassium 4.3 3.5 - 5.3 mmol/L    Chloride 103 96 - 108 mmol/L    CO2 31 21 - 32 mmol/L    ANION GAP 7 4 - 13 mmol/L    BUN 12 5 - 25 mg/dL    Creatinine 0.84 0.60 - 1.30 mg/dL    Glucose, Fasting 83 65 - 99 mg/dL    Calcium 9.2 8.4 - 10.2 mg/dL    AST 18 13 - 39 U/L    ALT 26 7 - 52 U/L    Alkaline Phosphatase 63 34 - 104 U/L    Total Protein 7.6 6.4 - 8.4 g/dL    Albumin 4.4 3.5 - 5.0 g/dL    Total Bilirubin 0.56 0.20 - 1.00 mg/dL    eGFR 102 ml/min/1.73sq m        Physical Exam  Constitutional:       General: He is not in acute distress.     Appearance: Normal appearance. He is obese.   HENT:      Head: Normocephalic and atraumatic.      Nose: Nose normal.      Mouth/Throat:      Mouth: Mucous membranes are moist.   Eyes:      Extraocular Movements: Extraocular movements intact.      Pupils: Pupils are equal, round, and reactive to light.   Cardiovascular:      Rate and Rhythm: Normal rate and regular rhythm.      Pulses: Normal pulses.      Heart sounds: Normal heart sounds. No murmur heard.     No friction rub.   Pulmonary:      Effort: Pulmonary effort is normal. No respiratory distress.      Breath sounds: Normal breath sounds. No wheezing.   Abdominal:      General: Abdomen is flat. Bowel sounds are normal. There is no distension.      Palpations: Abdomen is soft. There is no mass.      Tenderness: There is no abdominal tenderness. There is no guarding.   Musculoskeletal:         General: Normal range of motion.      Cervical back: Neck supple.   Skin:     General: Skin is warm.   Neurological:      General: No focal deficit present.      Mental Status: He is alert and oriented to person, place, and time. Mental status is at baseline.      Cranial Nerves: No cranial nerve deficit.   Psychiatric:         Mood and Affect: Mood normal.         Behavior: Behavior normal.

## 2025-01-21 NOTE — PATIENT INSTRUCTIONS
Metformin instructions     Take 1 tablet 500mg in AM with breakfast x 7 days and then increase to 2 tablets with breakfast.     cautions: Nausea and diarrhea are the most common side effects which may improve in 1 to 2 weeks. Hypoglycemia may occur with insufficient calorie intake, strenuous exercise, or concomitant use of hypoglycemic agents or ethanol; increased risk in elderly.   Low vitamin B12 levels may occur; monitoring recommended.  Recommended supplementation with a good-quality over-the-counter B complex vitamin.  B12 levels will be checked yearly and additional supplementation recommended if needed.    Adverse effects   Cobalamin deficiency (7% to 17.4% )  Gastrointestinal: Diarrhea (53% (immediate-release) ; 10% to 13% (extended-release) ), Flatulence , Indigestion, Malabsorption syndrome, Nausea (7% (extended-release) ), Vomiting (Up to 25.5% )    I have sent Zebound to your pharmacy. The prior authorization process will been done through our prior authorization team and can take up to 3-4 weeks to process through the insurance.     - Start Zepbound 2.5 mg subcutaneously weekly. After you have taken the second pen, please give me an update, as we will likely increase the dose the next month if you are tolerating it well.    - Side effects of Zepbound include nausea, vomiting, diarrhea, or constipation. Keep an eye on your heart rate while on Zepbound. If you resting heart rate is greater than 100 beats per minutes, please notify me. If you develop severe abdominal pain, stop Zepbound and go to the emergency room, as that could be a sign of pancreatitis.     - Please notify me if you have surgery, upper endoscopy, or colonoscopy scheduled, as we typically hold Zepbound for one week prior to the procedure.     GLP-1 Managing Side Effects Tips:  - focus on small frequent meals  - moderate sugar and fat intake  - change the injection site (abdomen --> thigh--> back of arm)  - eat protein, crackers, mints  and preethi-based drinks  - nighttime injections  - drink plenty of water  - consider fiber supplements like miralax    Tips for Nausea:  - Don't drink and eat simultaneously: separate fluids 30-60 minutes before and after meals when experiencing nausea or if you notice you become full quickly  - Choose mild smelling foods- strong smells can exacerbate nausea  - Preethi and peppermint- consider drinking preethi or peppermint tea, which can help alleviate symptoms  - Eat- don't skip meals, if you have nausea, it is understandable that you may not feel like eating. However, skipping meals is generally not recommended as this can lead to lower blood sugar and fatigue. Furthermore, it is essential to consume adequate protein during weight loss. You can opt for a protein shake, a meal replacement supplement, bone broth or soup.     Tips for Constipation:   - Drink plenty of water  - Eat food with a high fiber content: increase your fiber intake by consuming these types of foods:   Eat more whole grain products like barley, oats, farro, brown or wild rice and quinoa.    Look for choices with 100% whole wheat, rye, oats or bran as the first ingredient listed    Check nutrition fact labels and choose products with 4gm of dietary fiber or more per serving   Add more beans to your diet   Eat more fresh fruits and vegetables with skins on them    Exercise- increase physical activity as it helps stimulate gastric mobility, which moves stool through the digestive tract

## 2025-01-21 NOTE — PROGRESS NOTES
Assessment/Plan:  Nasir was seen today for follow-up.    Diagnoses and all orders for this visit:    Obesity, Class III, BMI 40-49.9 (morbid obesity) (HCC)  -     Discontinue: Tirzepatide-Weight Management (Zepbound) 2.5 MG/0.5ML SOLN; Inject 2.5 mg under the skin once a week  -     metFORMIN (GLUCOPHAGE-XR) 500 mg 24 hr tablet; Take 2 tablets (1,000 mg total) by mouth daily with breakfast  -     Tirzepatide-Weight Management (Zepbound) 2.5 MG/0.5ML SOLN; Inject 2.5 mg under the skin once a week    Hyperlipidemia  -     Discontinue: Tirzepatide-Weight Management (Zepbound) 2.5 MG/0.5ML SOLN; Inject 2.5 mg under the skin once a week  -     metFORMIN (GLUCOPHAGE-XR) 500 mg 24 hr tablet; Take 2 tablets (1,000 mg total) by mouth daily with breakfast  -     Tirzepatide-Weight Management (Zepbound) 2.5 MG/0.5ML SOLN; Inject 2.5 mg under the skin once a week    Prediabetes  -     Discontinue: Tirzepatide-Weight Management (Zepbound) 2.5 MG/0.5ML SOLN; Inject 2.5 mg under the skin once a week  -     metFORMIN (GLUCOPHAGE-XR) 500 mg 24 hr tablet; Take 2 tablets (1,000 mg total) by mouth daily with breakfast  -     Tirzepatide-Weight Management (Zepbound) 2.5 MG/0.5ML SOLN; Inject 2.5 mg under the skin once a week    Essential hypertension  -     Discontinue: Tirzepatide-Weight Management (Zepbound) 2.5 MG/0.5ML SOLN; Inject 2.5 mg under the skin once a week  -     Tirzepatide-Weight Management (Zepbound) 2.5 MG/0.5ML SOLN; Inject 2.5 mg under the skin once a week         No problem-specific Assessment & Plan notes found for this encounter.       - Discussed options of HealthyCORE-Intensive Lifestyle Intervention Program, Very Low Calorie Diet-VLCD, Conservative Program, and Vertical Sleeve Gastrectomy and the role of weight loss medications.  - Explained the importance of making lifestyle changes first before starting anti-obesity medications. Patient is working with our dietitian team. He has also been exercising and  working on physical activity goals over the past several months.  Patient has worked with a nutritionist in the past.   - Patient should demonstrate lifestyle changes first before anti-obesity medication initiated.   - Patient is interested in pursuing Conservative Program  - Initial weight loss goal of 5-10% weight loss for improved health as studies have shown this is where we see the greatest impact on improving health and decreasing risk of obesity related conditions.  - Weight loss can improve patient's co-morbid conditions and/or prevent weight-related complications.  - Stop Bang 7/8 -- referred to sleep medicine   - Labs reviewed: As below.      General Recommendations:  Nutrition:  Eat breakfast daily.  Do not skip meals.     Food log (ie.) www.Thoora.com, sparkpeople.com, loseit.com, calorieking.com, etc.    Practice mindful eating.  Be sure to set aside time to eat, eat slowly, and savor your food.    Hydration:    At least 64oz of water daily.  No sugar sweetened beverages.  No juice (eat the fruit instead).    Exercise:  Studies have shown that the ideal exercise goal is somewhere between 150 to 300 minutes of moderate intensity exercise a week.  Start with exercising 10 minutes every other day and gradually increase physical activity with a goal of at least 150 minutes of moderate intensity exercise a week, divided over at least 3 days a week.  An example of this would be exercising 30 minutes a day, 5 days a week.  Resistance training can increase muscle mass and increase our resting metabolic rate.   FULL BODY resistance training is recommended 2-3 times a week.  Do not do this on consecutive days to allow for muscle recovery.    Aim for a bare minimum 5000 steps, even on days you do not exercise.    Monitoring:   Weigh yourself daily.  If this causes undue stress, then just weigh yourself once a week.  Weigh yourself the same time of the day with the same amount of clothing on.  Preferably this  should be done after waking up, before you eat, and with no clothing or minimal clothing on.    Specific Goals:  5-10 servings of fruits and vegetables per day  Food log (ie.) www.jaja.tv.com,Expreem.com,Optimal Solutions Integrationit.com,Reglare.com,etc.   Gradually increase physical activity to a goal of 5 days per week for 30 minutes of MODERATE intensity PLUS 2 days per week of FULL BODY resistance training    Calorie goal:  9144-7284(Provided with meal plan to follow).    Return visit:  2 months  1) RX Metformin 500mg week 1 and then increase to 1000mg weekly. Treating insulin resistance/prediabetes and weight management    2) Sent in RX for Zepbound 2.5mg weekly; sent through [facility] pharmacy for cash pay. Plan to increase to 5mg after 4 weeks.     3) continue with dietitian team for meal planning       - Side effects of Zepbound include nausea, vomiting, diarrhea, or constipation. Keep an eye on your heart rate while on Zepbound. If you resting heart rate is greater than 100 beats per minutes, please notify me. If you develop severe abdominal pain, stop Zepbound and go to the emergency room, as that could be a sign of pancreatitis.     - Please notify me if you have surgery, upper endoscopy, or colonoscopy scheduled, as we typically hold Zepbound for one week prior to the procedure.     4) Sleep medicine referral-- encouraged patient to go as he already has a referral  5) Physical Activity RX:  Recommend patient wears a watch to track his step count; recommend minimum of 5k per day and goal of 8-10k per day.   6) Nutrition RX: given handouts today for protein snacks/shakes, 2000 calorie meal plan and food journal to track.         Total time spent reviewing chart, interviewing patient, examining patient, discussing plan, answering all questions, and documentin min.       ______________________________________________________________________        Subjective:   Chief Complaint   Patient presents with     Follow-up     MWM- 2 mo F/u; Waist-54.5in       HPI: Nasir Puente  is a 49 y.o. male with history of  impaired fasting blood sugar, hyperlipidemia and excess weight, here to pursue weight loss management.  Previous notes and records have been reviewed. Referred by PCP.     Patient has been doing well in the interim with making his nutrition changes. He has lost ~11 lbs since our last visit.     Blood pressure high in office, not on medication, he also has been having headaches.   He started to gain weight after marriage.   Patient has worked with nutritionist in the past and has lost weight with calorie deficit and portion sizes.   Brazilian- enjoy meat, rice, beans  Exercise- enjoys dancing, can walk in his neighborhood and around park/town.       Blood pressure is elevated today;   Sleep BANG 7/8-- > needs to go to sleep medicine   Prefers medication         HPI  Wt Readings from Last 20 Encounters:   01/21/25 (!) 155 kg (341 lb 9.6 oz)   12/30/24 (!) 157 kg (345 lb 9.6 oz)   11/22/24 (!) 160 kg (352 lb)   11/21/24 (!) 158 kg (349 lb 6.4 oz)   06/26/24 (!) 155 kg (342 lb)   05/08/24 (!) 156 kg (343 lb)   02/09/23 (!) 158 kg (348 lb)   09/27/22 (!) 152 kg (336 lb)   07/06/22 (!) 151 kg (332 lb)   06/01/22 (!) 148 kg (326 lb)   08/31/21 (!) 147 kg (324 lb)   03/29/21 (!) 147 kg (323 lb)   10/29/17 136 kg (300 lb)   07/26/17 134 kg (296 lb)     Excess Weight:  Severity: severe  Onset:  30's   Highest weight: 349 lbs  Lowest Weight:   Current weight: 349 lbs  What has been tried: Diet and Exercise, Over the Counter Weight Loss Supplements, and nutritionist   Contributing factors: Poor Food Choices, Insufficient Physical Activity, and Stress/Emotional Eating  Associated symptoms and effects: comorbid conditions, fatigue, increased joint pain, decreased exercise capacity, and increased shortness of breath    Food logging: (did not have time to complete today). Wife cooks dinner at home; portion size is patients issue  per wife  B:   S:  L:  S:  D:  S: frequent snacking at night as he is unable to sleep     Hunger/Cravings: hungry all the time; trouble with sleeping at night and increased snacking at night   Dining out: 1x per week  Hydration: monster energy 1 drink, 1 coke every once in a while. Drinks a lot of water   Alcohol: social 1x per month   Smoking: smoked 1 day and stopping again  Exercise:  Weight Monitoring:  Sleep: only sleeps for about 5 hrs   STOP-BANG Score:  Occupation: construction, carpentry. 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  Denies any hx of glaucoma, seizures, kidney stones, gallstones.  Denies Hx of CAD, PAD, palpitations, arrhythmia.   Denies uncontrolled anxiety or depression, suicidal behavior or thinking , insomnia or sleep disturbance.   Past Surgical History:   Procedure Laterality Date    FACIAL/NECK BIOPSY Left 7/26/2017    Procedure: SHOULDER,NECK & EPIGASTRIC AREA MASS EXCISIONS abdominal lesions x2;  Surgeon: Randy Mcmahon MD;  Location: [facility];  Service: General    NE RPR UMBILICAL HRNA 5 YRS/> REDUCIBLE N/A 7/26/2017    Procedure: REPAIR HERNIA UMBILICAL;  Surgeon: Randy Mcmahon MD;  Location: [facility];  Service: General     The following portions of the patient's history were reviewed and updated as appropriate: allergies, current medications, past family history, past medical history, past social history, past surgical history, and problem list.    Review Of Systems:  Review of Systems   Constitutional:  Positive for fatigue.   HENT: Negative.     Eyes: Negative.    Respiratory: Negative.     Gastrointestinal:  Negative for constipation, diarrhea and nausea.   Genitourinary: Negative.    Musculoskeletal: Negative.    Skin: Negative.    Allergic/Immunologic: Negative.    Neurological:  Positive for headaches.   Hematological: Negative.  "   Psychiatric/Behavioral: Negative.         Objective:  /90 (BP Location: Left arm, Patient Position: Sitting)   Pulse 79   Temp 97.6 °F (36.4 °C) (Tympanic)   Resp 16   Ht 5' 9\" (1.753 m)   Wt (!) 155 kg (341 lb 9.6 oz)   BMI 50.45 kg/m²   Physical Exam  Vitals reviewed.   Constitutional:       Appearance: He is obese.   HENT:      Head: Normocephalic.      Mouth/Throat:      Mouth: Mucous membranes are moist.   Eyes:      Pupils: Pupils are equal, round, and reactive to light.   Cardiovascular:      Rate and Rhythm: Normal rate and regular rhythm.   Pulmonary:      Effort: Pulmonary effort is normal.      Breath sounds: Normal breath sounds.   Abdominal:      General: Bowel sounds are normal.      Palpations: Abdomen is soft.   Musculoskeletal:         General: Normal range of motion.      Cervical back: Normal range of motion.   Skin:     General: Skin is warm and dry.   Neurological:      General: No focal deficit present.      Mental Status: He is alert.   Psychiatric:         Mood and Affect: Mood normal.         Thought Content: Thought content normal.         Judgment: Judgment normal.         Labs and Imaging  Recent labs and imaging have been personally reviewed.  Lab Results   Component Value Date    WBC 7.27 05/09/2024    HGB 14.7 05/09/2024    HCT 45.5 05/09/2024    MCV 87 05/09/2024     05/09/2024     Lab Results   Component Value Date    SODIUM 141 01/08/2025    K 4.3 01/08/2025     01/08/2025    CO2 31 01/08/2025    AGAP 7 01/08/2025    BUN 12 01/08/2025    CREATININE 0.84 01/08/2025    GLUF 83 01/08/2025    CALCIUM 9.2 01/08/2025    AST 18 01/08/2025    ALT 26 01/08/2025    ALKPHOS 63 01/08/2025    TP 7.6 01/08/2025    TBILI 0.56 01/08/2025    EGFR 102 01/08/2025     Lab Results   Component Value Date    HGBA1C 6.2 (H) 01/08/2025     Lab Results   Component Value Date    CSR8NDSBRQFL 4.054 01/08/2025     Lab Results   Component Value Date    CHOLESTEROL 212 (H) 01/08/2025 "     Lab Results   Component Value Date    HDL 38 (L) 01/08/2025     Lab Results   Component Value Date    TRIG 153 (H) 01/08/2025     Lab Results   Component Value Date    LDLCALC 143 (H) 01/08/2025

## 2025-01-21 NOTE — TELEPHONE ENCOUNTER
Awaiting for New updated insurance information for office visit. Please provide the ID numbers and group information. If at all possible can upload or take a picture and upload the insurance card on [email or web address].

## 2025-01-23 NOTE — PROGRESS NOTES
"Weight Management Medical Nutrition Assessment  Nasir presented for a meal planning session with wife. Pt is Cypriot and speaks New Zealander. Wife was able to assist with translation. Today's weight is 345.6#, loss of 2.8-# since prior visit.  Hx of impaired fasting blood sugar, hyperlipidemia , HTN. Prescribed Zepbound but waiting for coverage. Currently waiting on Zepbound to pay out of pocket.  Highly motivated to start the combination of medication and calorie restriction.    Per dietary recall patient consumes excess calories from  carbohydrates such as bread and excess dining out at [facility].  Wife states patient enjoys large servings of carbohydrates such as bread, rice and pasta and also frequent stops to [facility] for bread, sandwiches and coffee at least 5-6 times a week. Educated on the importance of balancing all meals and with the importance of adequate protein and fiber. Reviewed lean protein and balanced snacks. Hydration adequate. Increased vegetable and salad. No red meat, decreased added sugar to coffee, eliminated sodas and juices.  Rice portion are less. Will focus on decreasing sugar intake and would like to lose 10# in the next month. Struggles with physical activity but states he wants to include at least 3 days a week of some sort of physical activity. Open to Robard/ new Direction granola bars and meal replacements.   Developed and reviewed a low calorie meal plan. Goals established. Will follow up in 4 weeks.      Goals:  1)Continue to  add more vegetables   2) Add in exercise 2-3 days a week(walkling)     Patient seen by Medical Provider in past 6 months:  yes 11/21/2024  Requested to schedule appointment with Medical Provider: No      Anthropometric Measurements  Start Weight (#) & Date: 349# 11/21/2024  Current Weight (#): 342.8.#  TBW % Change from start weight:1%  Ideal Body Weight (#):160# (5'9)  Goal Weight (#):\"Better health to be around family\"   Highest: 349#  Lowest: 240#    Weight Loss " "History  Previous weight loss attempts: Diet and Exercise, Over the Counter Weight Loss Supplements, and nutritionist     Food and Nutrition Related History  Wake up: Varies 5-8 am   Bed Time:Varies--doesn't sleep well at night    Food Recall  Breakfast:6 am: Coffee with sugar with 1 tsp, and cream .Sometimes from [employer]. 2-3  Eggs + 647 bread x 2. Green shake kale +pineapple +green apple + protein powder.   Snack:skip  Lunch:12-1 pm: Rice and beans, meat/chicken + vegetable leftovers from dinner night before   Snack:coffee 16 oz sometimes with Grilled cheese from [employer] coffee + 2 sugars  Dinner:Homemade: Rice + beans+  meat( chicken/fish) + salad   Snack: n/a      Beverages: flavored water, seltzer water, Coffee  Volume of beverage intake: 32 oz coffee + 1 spoon sugar, 3-4 16 oz bottles water a day    Weekends: Same Meals are not regualr- varied. Can skip meals breakfast and lunch  Cravings: bread and rice  Trouble area of day:Dinner    Frequency of Eating out: 5-6 days a week  Food restrictions:n/a  Cooking: Wife  Food Shopping: Wife    Physical Activity Intake  Activity:enjoys dancing, can walk in his neighborhood and [address] park[place]town. Works as a construction work.Would like to start walking 3 times a week in the community center  Frequency: n/a  Physical limitations/barriers to exercise: \"dislikes\" exercise    Estimated Needs  Energy  SECA: BMR:n/a      X 1.3 -1000 =  [facility] Energy Needs: BMR : 2418   1-2# loss weekly sedentary:  1662-7468             1-2# loss weekly lightly active:6408-8438  Maintenance calories for sedentary activity level: 2902  Protein: grams      (1.2-1.5g/kg IBW)  Fluid: 84 oz     (35mL/kg IBW)    Nutrition Diagnosis  Yes;    Overweight/obesity  related to Excess energy intake as evidenced by  BMI more than normative standard for age and sex (obesity-grade III 40+)       Nutrition Intervention    Nutrition Prescription  Calories:2200 kcal   Protein:100 grams  Fluid:84 " oz    Meal Plan (José Miguel/Pro/Carb)  Breakfast:450/25-30  Snack:200/5-10 grams  Lunch:550/25-35 grams  Snack:200/5-10 grams  Dinner:600 kcal/25-35 kcal  Snack:skip    Nutrition Education:    Calorie controlled menu  Lean protein food choices  Healthy snack options  Food journaling tips      Nutrition Counseling:  Strategies: meal planning, portion sizes, healthy snack choices, hydration, fiber intake, protein intake, exercise, food journal      Monitoring and Evaluation:  Evaluation criteria:  Energy Intake  Meet protein needs  Maintain adequate hydration  Monitor weekly weight  Meal planning/preparation  Food journal   Decreased portions at mealtimes and snacks  Physical activity     Barriers to learning:language  Readiness to change: Action:  (Changing behavior)  Comprehension: good  Expected Compliance: good

## 2025-01-27 ENCOUNTER — CLINICAL SUPPORT (OUTPATIENT)
Age: 50
End: 2025-01-27

## 2025-01-27 ENCOUNTER — TELEPHONE (OUTPATIENT)
Age: 50
End: 2025-01-27

## 2025-01-27 VITALS — BODY MASS INDEX: 46.65 KG/M2 | WEIGHT: 315 LBS | HEIGHT: 69 IN

## 2025-01-27 DIAGNOSIS — R63.5 ABNORMAL WEIGHT GAIN: Primary | ICD-10-CM

## 2025-01-27 PROCEDURE — WMDI30

## 2025-01-27 PROCEDURE — RECHECK

## 2025-01-27 NOTE — TELEPHONE ENCOUNTER
Patient's wife called in to confirm what his upcoming appointment was for. She stated they will bring his insurance information is as well for his previous appointment to be covered. Advised caller that would be perfect and we will upload that information into his chart accordingly.

## 2025-04-18 ENCOUNTER — TELEPHONE (OUTPATIENT)
Dept: GASTROENTEROLOGY | Facility: CLINIC | Age: 50
End: 2025-04-18

## 2025-04-18 NOTE — TELEPHONE ENCOUNTER
Called the patient and he stated that his English isn't good and he will call back later with his son to help him out. We were calling to schedule colonoscopy from referral

## 2025-04-29 DIAGNOSIS — E78.5 HYPERLIPIDEMIA: ICD-10-CM

## 2025-04-29 DIAGNOSIS — E66.813 OBESITY, CLASS III, BMI 40-49.9 (MORBID OBESITY): Primary | ICD-10-CM

## 2025-04-29 DIAGNOSIS — R73.03 PREDIABETES: ICD-10-CM

## 2025-04-29 DIAGNOSIS — I10 ESSENTIAL HYPERTENSION: ICD-10-CM

## 2025-04-29 RX ORDER — TIRZEPATIDE 2.5 MG/.5ML
2.5 INJECTION, SOLUTION SUBCUTANEOUS WEEKLY
Qty: 2 ML | Refills: 1 | Status: SHIPPED | OUTPATIENT
Start: 2025-04-29

## 2025-05-20 ENCOUNTER — RA CDI HCC (OUTPATIENT)
Dept: OTHER | Facility: HOSPITAL | Age: 50
End: 2025-05-20

## 2025-05-20 NOTE — PROGRESS NOTES
Please review if this dx is applicable to the patient's condition and assess and document, if applicable in next visit        [id number]    HCC coding opportunities          Chart Reviewed number of suggestions sent to Provider: 1     Patients Insurance        Commercial Insurance: [employer] Commercial [employer]

## 2025-05-28 ENCOUNTER — TELEPHONE (OUTPATIENT)
Dept: INTERNAL MEDICINE CLINIC | Facility: CLINIC | Age: 50
End: 2025-05-28

## 2025-06-13 ENCOUNTER — OFFICE VISIT (OUTPATIENT)
Dept: INTERNAL MEDICINE CLINIC | Facility: CLINIC | Age: 50
End: 2025-06-13
Payer: COMMERCIAL

## 2025-06-13 VITALS
SYSTOLIC BLOOD PRESSURE: 132 MMHG | DIASTOLIC BLOOD PRESSURE: 99 MMHG | TEMPERATURE: 98.2 F | HEART RATE: 78 BPM | OXYGEN SATURATION: 93 % | BODY MASS INDEX: 46.65 KG/M2 | WEIGHT: 315 LBS | HEIGHT: 69 IN

## 2025-06-13 DIAGNOSIS — Z12.11 SCREENING FOR COLON CANCER: ICD-10-CM

## 2025-06-13 DIAGNOSIS — I10 PRIMARY HYPERTENSION: ICD-10-CM

## 2025-06-13 DIAGNOSIS — J45.909 ASTHMA: ICD-10-CM

## 2025-06-13 DIAGNOSIS — Z00.00 PHYSICAL EXAM, ANNUAL: ICD-10-CM

## 2025-06-13 DIAGNOSIS — E66.01 MORBID OBESITY (HCC): ICD-10-CM

## 2025-06-13 DIAGNOSIS — R73.01 IMPAIRED FASTING BLOOD SUGAR: Primary | ICD-10-CM

## 2025-06-13 PROCEDURE — 99396 PREV VISIT EST AGE 40-64: CPT | Performed by: INTERNAL MEDICINE

## 2025-06-13 NOTE — PROGRESS NOTES
"Assessment/Plan:    Yearly physical  and check up     Diagnoses and all orders for this visit:    Impaired fasting blood sugar  -     Hemoglobin A1C; Future  -     Comprehensive metabolic panel    Screening for colon cancer  -     Ambulatory Referral to Gastroenterology; Future    Primary hypertension  -     Lipid panel; Future    Morbid obesity (HCC)    Physical exam, annual    Asthma          Subjective: Pain  R Knee     Patient ID: Nasir Puente is a 49 y.o. male.    Leg Pain   Pertinent negatives include no numbness.       The following portions of the patient's history were reviewed and updated as appropriate: allergies, current medications, past family history, past medical history, past social history, past surgical history, and problem list.    Review of Systems   Constitutional: Negative.    HENT:  Negative for dental problem, drooling, ear discharge and ear pain.    Eyes:  Negative for discharge, redness and itching.   Respiratory:  Negative for apnea, cough and wheezing.    Cardiovascular:  Negative for chest pain and palpitations.   Gastrointestinal:  Negative for abdominal pain, blood in stool, diarrhea and vomiting.   Endocrine: Negative for polydipsia, polyphagia and polyuria.   Genitourinary:  Negative for decreased urine volume, dysuria and frequency.   Musculoskeletal:  Negative for arthralgias, myalgias and neck stiffness.   Skin:  Negative for pallor and wound.   Allergic/Immunologic: Negative for environmental allergies and food allergies.   Neurological:  Negative for facial asymmetry, light-headedness, numbness and headaches.   Hematological:  Negative for adenopathy. Does not bruise/bleed easily.   Psychiatric/Behavioral:  Negative for agitation, behavioral problems and confusion.          Objective:      /99 (BP Location: Left arm, Patient Position: Sitting, Cuff Size: Standard)   Pulse 78   Temp 98.2 °F (36.8 °C) (Temporal)   Ht 5' 9\" (1.753 m)   Wt (!) 155 kg (341 lb)   SpO2 93% "   BMI 50.36 kg/m²          Physical Exam  Constitutional:       Appearance: Normal appearance. He is obese.   HENT:      Head: Normocephalic.      Nose: Nose normal.      Mouth/Throat:      Mouth: Mucous membranes are moist.     Eyes:      Pupils: Pupils are equal, round, and reactive to light.       Cardiovascular:      Rate and Rhythm: Regular rhythm.      Heart sounds: Normal heart sounds.   Pulmonary:      Breath sounds: Normal breath sounds.   Abdominal:      Palpations: Abdomen is soft.     Musculoskeletal:         General: No swelling.      Cervical back: Neck supple.     Skin:     General: Skin is warm.     Neurological:      General: No focal deficit present.      Mental Status: He is alert and oriented to person, place, and time.     Psychiatric:         Mood and Affect: Mood normal.       Hypertension    controlled  on  current meds  Impaired  glucose    wants  to   resolve with diet  Morbid Obesity    Refuses  Bariatric  surgery     Blood work  ordered.     Fup  6 months.     Mateus Thompson MD.FACP

## 2025-08-01 DIAGNOSIS — I10 ESSENTIAL HYPERTENSION: ICD-10-CM

## 2025-08-04 RX ORDER — VALSARTAN 80 MG/1
80 TABLET ORAL DAILY
Qty: 90 TABLET | Refills: 1 | Status: SHIPPED | OUTPATIENT
Start: 2025-08-04